# Patient Record
Sex: MALE
[De-identification: names, ages, dates, MRNs, and addresses within clinical notes are randomized per-mention and may not be internally consistent; named-entity substitution may affect disease eponyms.]

---

## 2020-05-14 ENCOUNTER — TRANSCRIPTION ENCOUNTER (OUTPATIENT)
Age: 60
End: 2020-05-14

## 2021-12-09 ENCOUNTER — APPOINTMENT (OUTPATIENT)
Dept: GASTROENTEROLOGY | Facility: CLINIC | Age: 61
End: 2021-12-09

## 2021-12-09 PROBLEM — Z00.00 ENCOUNTER FOR PREVENTIVE HEALTH EXAMINATION: Status: ACTIVE | Noted: 2021-12-09

## 2024-03-05 ENCOUNTER — APPOINTMENT (OUTPATIENT)
Dept: GASTROENTEROLOGY | Facility: CLINIC | Age: 64
End: 2024-03-05
Payer: COMMERCIAL

## 2024-03-05 VITALS
TEMPERATURE: 98.2 F | SYSTOLIC BLOOD PRESSURE: 155 MMHG | HEIGHT: 67 IN | RESPIRATION RATE: 16 BRPM | DIASTOLIC BLOOD PRESSURE: 80 MMHG | OXYGEN SATURATION: 98 % | BODY MASS INDEX: 24.17 KG/M2 | WEIGHT: 154 LBS | HEART RATE: 68 BPM

## 2024-03-05 DIAGNOSIS — R19.7 DIARRHEA, UNSPECIFIED: ICD-10-CM

## 2024-03-05 PROCEDURE — 99204 OFFICE O/P NEW MOD 45 MIN: CPT

## 2024-03-05 NOTE — PHYSICAL EXAM
[Normal Voice/Communication] : normal voice/communication [No Acute Distress] : no acute distress [Normal Appearance] : the appearance of the neck was normal [No Respiratory Distress] : no respiratory distress [No Acc Muscle Use] : no accessory muscle use [Abdomen Tenderness] : non-tender [Bowel Sounds] : normal bowel sounds [Abdomen Soft] : soft [No Masses] : no abdominal mass palpated [Normal Color / Pigmentation] : normal skin color and pigmentation [] : no rash [Normal] : oriented to person, place, and time

## 2024-03-19 NOTE — HISTORY OF PRESENT ILLNESS
[FreeTextEntry1] : 63 yo M referred today for concern for suspected chronic pancreatitis with elevated IGG4  Was at baseline state of health until about 2-3 year ago when he experienced acute abdominal pain which prompted f/u with Dr. Rendon.  Per outside records, seen in 2022 for epigastric pain and mild elevation in amlyase/AST.  CT A/P in Dec 2021 suggesting minimal peripanc infiltration in tail.  He recalls having an EGD that was normal. No report available.  Subsequently developed stools which were more oily in appearance. Seems to be exacerbated by certain foods.  Having 2x BMs daily.    Again underwent CT A/P in 2023 showing diffuse pancreatic parenchymal atrophy with associated mild diffuse PD dilatation. Few small filling defects in PD c/w calculi. Liver normal. biliary tree normal.   Started on creon by Dr. Rendon with improvement in stools.  Labs in 2024- SILVANO, ASMA, AMA, anti-LK microsomal Ab, unremarkable. Ceruloplasmin 15 Alk phos 78 AST 48 ALT 91 Bili 0.7 IgG4 521  Has lost 30 lbs over about 1 year.    Colonoscopy in [id number] was normal as far as he knows  PMhx:  -DM2 (last A1C >8) -HTN -HLD  Medications: -Tresiba daily -Novolog 12 units qAC -Zetia 10 mg daily -Crestor 40 mg daily -Amlodipine/Benazepril 10-40 mg daily -Dapagliflozin/metformin 5-1000 mg daily -Creon 2-3 capsules before meals2  Allergies: NKDA Surgical Hx: None Family: Father DM2, Sister CRC  at 69 Social Hx: No ETOH. Former smoker >20 ppy hx, quit

## 2024-03-19 NOTE — ASSESSMENT
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  Very likely pancreatic insufficiency and associated exocrine failure also contributing difficulty achieving glycemic control.  At this point, mgmt of underlying panc manifestations of suspected IGG4 related disease with steroid therapy of limited utility and would contribute to more harm related to steroid therapy (ie. hyperglycemia).  As such, primary goal should be controlling and optimizing sequelae of pancreatic insufficiency/chronic pancreatitis.  Recommendations: -Check fecal elastase and qualitative fecal fat; will need to stop creon for 72 hours before -Continued f/u with endo to optimize glycemic control -Continue creon -Rheumatology f/u to complete broad autoimmune eval  Seen and discussed with Dr. Richard Middleton DO GI Fellow [facility]

## 2024-03-25 ENCOUNTER — APPOINTMENT (OUTPATIENT)
Dept: RHEUMATOLOGY | Facility: CLINIC | Age: 64
End: 2024-03-25
Payer: COMMERCIAL

## 2024-03-25 VITALS
SYSTOLIC BLOOD PRESSURE: 156 MMHG | TEMPERATURE: 98.1 F | DIASTOLIC BLOOD PRESSURE: 84 MMHG | OXYGEN SATURATION: 98 % | HEIGHT: 67 IN | BODY MASS INDEX: 24.01 KG/M2 | HEART RATE: 64 BPM | WEIGHT: 153 LBS

## 2024-03-25 PROCEDURE — 99204 OFFICE O/P NEW MOD 45 MIN: CPT

## 2024-03-25 PROCEDURE — 36415 COLL VENOUS BLD VENIPUNCTURE: CPT

## 2024-03-25 NOTE — HISTORY OF PRESENT ILLNESS
[FreeTextEntry1] : 63 yo M w T2DM,  chronic pancreatitis,  and  elevated IGG4 Pt has longstanding h/o T2DM, now on insulin. Was his usual self up until  3 year ago when he started experiencing abdominal pain, was dx w pancreatitis in the setting of elevated amlyase and AST. CT A/P in Dec 2021 suggesting minimal peripancreatic infiltration in tail. [facility] GI note he had 'EGD that was normal'. Since that time he was experiencing  oily stools and weight loss. In the past 1-2 yrs lost 30 lb. T2DM was worsening A1C >9 and was placed on insulin. CT A/P in 12/29/2023 w diffuse pancreatic parenchymal atrophy with associated mild diffuse PD dilatation. Few small filling defects in PD c/w calculi. Liver normal. biliary tree normal and lower portion of the lungs normal.  Started on creon.  Today denies joint pain or swelling, no fevers, chills or night sweats. Has no LAD, orbital swelling, rashes, abdominal pain, morning stiffness, CP and no SOB. Morning stiffness few minutes, diffuse.  Labs in Jan 2024- SILVANO, ASMA, AMA, anti-LK microsomal Ab, unremarkable. Ceruloplasmin 15 Alk phos 78 AST 48 ALT 91 Bili 0.7 IgG4 521

## 2024-03-25 NOTE — PHYSICAL EXAM
[TextEntry] :   PHYSICAL EXAM: Vital signs reviewed, normal . Pain 5/10 GEN: AAOx3, NAD - well appearing EYES: PERRL, EOMI. EAR, NOSE AND THROAT:  Oropharynx pink w/o ulcers or exudates. Poor dentition. B/l enlarged submandibular glands, painless HEMATOLOGIC/LYMPHATIC: No LAD. PULMONARY: Good air movement, CTA, no wheezes or rales. CARDIO-VASCULAR: NS1S2, RRR, no mrg. GASTRO-INTESTINAL: Normoactive BS, soft NT/ND, no organomegaly. MUSCULOSKELETAL: DIPs- Heberden's no synovitis. PIPs- Margarito's, no synovitis. MCPs- Full ROM, no synovitis. WRISTS- Full ROM, no synovitis. ELBOWS- Full ROM, no synovitis. SHOULDERS- Full ROM b/l. No localized tenderness in subacromial bursa or biceps tendon. HIPS- Full ROM including internal and external rotation; No localized tenderness over greater trochanters. KNEES- No effusion, normal ROM, no local tenderness in the region of anserine bursa or joint line. ANKLES- Full ROM, no synovitis. FEET-No  MTP squeeze tenderness. Warm extremities, 2+ radial and pedal pulses, no peripheral edema. NEUROLOGIC: Fluent speech, normal gait. Muscle strength 5/5 in all extremities. SKIN: Clear, no rashes.

## 2024-03-25 NOTE — REVIEW OF SYSTEMS
Head,  normocephalic,  atraumatic,  Face,  Face within normal limits,  Ears,  External ears within normal limits,  Nose/Nasopharynx,  External nose  normal appearance,  nares patent,  no nasal discharge,  Mouth and Throat,  Oral cavity appearance normal,  Breath odor normal,  Lips,  Appearance normal [TextEntry] :  REVIEW OF THE SYSTEMS CONSTITUTIONAL: No fever, chills, night sweats. Has weight loss. SKIN: No rash, psoriatic plaques, photosensitivity, Raynaud's phenomenon, subcutaneous nodules, tophi, alopecia, skin thickening. EYES: No dry eyes, history of inflammatory eye disease. No blurry vision or diplopia. ENMT: No dry mouth, oral ulcers, sinus problems, epistaxis. No jaw or scalp tenderness. MUSCULOSKELETAL: No joint pain or swelling. NEUROLOGIC: No HA, paresthesia, generalized or focal weakness. CARDIO-VASCULAR: No CP, orthopnea, palpitations. PULMONARY: No SOB, cough, hemoptysis, wheezing. GASTROINTESTINAL: No recent/current diarrhea, nausea/vomiting, abdominal pain, bleed, dysphagia or GERD. GENITOURINARY: No hematuria or dysuria. HEMATOLOGIC/LYMPHATIC: No lymphadenopathy.

## 2024-03-25 NOTE — ASSESSMENT
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 Plan: - Assess basic ID w/up TB, HBV and HCV screens, IgG levels, CRP, Sed rate, Immunofixation, SPEP, IgE levels, C3, C4 and Ua UPCr. - CT neck - Will not rx steroids now given uncontrolled DM. Discussed Immunosuppression options for tx of IgG 4 Rd.  - Pain mng   - Follow up in 4w   Tana Valente MD Rheumatology Attending  #

## 2024-03-28 LAB
FAT STL QN: NORMAL
FAT STL QN: NORMAL
PANCREATIC ELASTASE, FECAL: <50 CD:794062645

## 2024-03-29 LAB
ALBUMIN MFR SERPL ELPH: 52.7 %
ALBUMIN SERPL ELPH-MCNC: 4.2 G/DL
ALBUMIN SERPL-MCNC: 3.9 G/DL
ALBUMIN/GLOB SERPL: 1.1 RATIO
ALBUPE: 9.7 %
ALP BLD-CCNC: 85 U/L
ALPHA1 GLOB MFR SERPL ELPH: 3 %
ALPHA1 GLOB SERPL ELPH-MCNC: 0.2 G/DL
ALPHA1UPE: 29.3 %
ALPHA2 GLOB MFR SERPL ELPH: 9.5 %
ALPHA2 GLOB SERPL ELPH-MCNC: 0.7 G/DL
ALPHA2UPE: 20.3 %
ALT SERPL-CCNC: 66 U/L
ANION GAP SERPL CALC-SCNC: 9 MMOL/L
APPEARANCE: CLEAR
AST SERPL-CCNC: 32 U/L
B-GLOBULIN MFR SERPL ELPH: 11.9 %
B-GLOBULIN SERPL ELPH-MCNC: 0.9 G/DL
BASOPHILS # BLD AUTO: 0.11 K/UL
BASOPHILS NFR BLD AUTO: 1.8 %
BETAUPE: 21.3 %
BILIRUB SERPL-MCNC: 0.5 MG/DL
BILIRUBIN URINE: NEGATIVE
BLOOD URINE: NEGATIVE
BUN SERPL-MCNC: 12 MG/DL
C3 SERPL-MCNC: 99 MG/DL
C4 SERPL-MCNC: 20 MG/DL
CALCIUM SERPL-MCNC: 8.7 MG/DL
CHLORIDE SERPL-SCNC: 105 MMOL/L
CO2 SERPL-SCNC: 25 MMOL/L
COLOR: YELLOW
CREAT SERPL-MCNC: 0.91 MG/DL
CREAT SPEC-SCNC: 142 MG/DL
CREAT/PROT UR: 0.1 RATIO
CRP SERPL-MCNC: <3 MG/L
EGFR: 94 ML/MIN/1.73M2
EOSINOPHIL # BLD AUTO: 0.42 K/UL
EOSINOPHIL NFR BLD AUTO: 6.9 %
ERYTHROCYTE [SEDIMENTATION RATE] IN BLOOD BY WESTERGREN METHOD: 18 MM/HR
GAMMA GLOB FLD ELPH-MCNC: 1.7 G/DL
GAMMA GLOB MFR SERPL ELPH: 22.9 %
GAMMAUPE: 19.4 %
GLUCOSE QUALITATIVE U: >=1000 MG/DL
GLUCOSE SERPL-MCNC: 126 MG/DL
HBV CORE IGG+IGM SER QL: NONREACTIVE
HBV SURFACE AB SER QL: NONREACTIVE
HBV SURFACE AG SER QL: NONREACTIVE
HCT VFR BLD CALC: 45 %
HCV AB SER QL: NONREACTIVE
HCV S/CO RATIO: 0.19 S/CO
HGB BLD-MCNC: 15.2 G/DL
IGA 24H UR QL IFE: NORMAL
IGG SER QL IEP: 1835 MG/DL
IGG1 SER-MCNC: 656 MG/DL
IGG2 SER-MCNC: 691 MG/DL
IGG3 SER-MCNC: 101.5 MG/DL
IGG4 SER-MCNC: 495.9 MG/DL
IMM GRANULOCYTES NFR BLD AUTO: 0.2 %
INTERPRETATION SERPL IEP-IMP: NORMAL
KAPPA LC 24H UR QL: NORMAL
KETONES URINE: NEGATIVE MG/DL
LEUKOCYTE ESTERASE URINE: NEGATIVE
LYMPHOCYTES # BLD AUTO: 2.08 K/UL
LYMPHOCYTES NFR BLD AUTO: 33.9 %
M PROTEIN SPEC IFE-MCNC: NORMAL
M TB IFN-G BLD-IMP: NEGATIVE
MAN DIFF?: NORMAL
MCHC RBC-ENTMCNC: 29.2 PG
MCHC RBC-ENTMCNC: 33.8 GM/DL
MCV RBC AUTO: 86.5 FL
MONOCYTES # BLD AUTO: 0.55 K/UL
MONOCYTES NFR BLD AUTO: 9 %
NEUTROPHILS # BLD AUTO: 2.96 K/UL
NEUTROPHILS NFR BLD AUTO: 48.2 %
NITRITE URINE: NEGATIVE
PH URINE: 5.5
PLATELET # BLD AUTO: 184 K/UL
POTASSIUM SERPL-SCNC: 3.8 MMOL/L
PROT PATTERN 24H UR ELPH-IMP: NORMAL
PROT SERPL-MCNC: 7.4 G/DL
PROT UR-MCNC: 20 MG/DL
PROTEIN URINE: NORMAL MG/DL
QUANTIFERON TB PLUS MITOGEN MINUS NIL: 6.72 IU/ML
QUANTIFERON TB PLUS NIL: 0.02 IU/ML
QUANTIFERON TB PLUS TB1 MINUS NIL: 0 IU/ML
QUANTIFERON TB PLUS TB2 MINUS NIL: 0 IU/ML
RBC # BLD: 5.2 M/UL
RBC # FLD: 13.6 %
SODIUM SERPL-SCNC: 139 MMOL/L
SPECIFIC GRAVITY URINE: 1.03
UROBILINOGEN URINE: 0.2 MG/DL
WBC # FLD AUTO: 6.13 K/UL

## 2024-04-08 LAB — IGE AB SERPL QL: 33 NG/ML

## 2024-04-29 ENCOUNTER — APPOINTMENT (OUTPATIENT)
Dept: RHEUMATOLOGY | Facility: CLINIC | Age: 64
End: 2024-04-29

## 2024-04-29 DIAGNOSIS — K86.89 OTHER SPECIFIED DISEASES OF PANCREAS: ICD-10-CM

## 2024-04-29 DIAGNOSIS — K86.1 OTHER CHRONIC PANCREATITIS: ICD-10-CM

## 2024-04-29 DIAGNOSIS — D89.84 IGG4-RELATED DISEASE: ICD-10-CM

## 2024-04-29 NOTE — ASSESSMENT
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 Plan: - Assess basic ID w/up TB, HBV and HCV screens, IgG levels, CRP, Sed rate, Immunofixation, SPEP, IgE levels, C3, C4 and Ua UPCr. - CT neck - Will not rx steroids now given uncontrolled DM. Discussed Immunosuppression options for tx of IgG 4 Rd. - Pain mng  - Follow up in 4w  Tana Valente MD Rheumatology Attending

## 2024-04-29 NOTE — REVIEW OF SYSTEMS
[TextEntry] : REVIEW OF THE SYSTEMS CONSTITUTIONAL: No fever, chills, night sweats. Has weight loss. SKIN: No rash, psoriatic plaques, photosensitivity, Raynaud's phenomenon, subcutaneous nodules, tophi, alopecia, skin thickening. EYES: No dry eyes, history of inflammatory eye disease. No blurry vision or diplopia. ENMT: No dry mouth, oral ulcers, sinus problems, epistaxis. No jaw or scalp tenderness. MUSCULOSKELETAL: No joint pain or swelling. NEUROLOGIC: No HA, paresthesia, generalized or focal weakness. CARDIO-VASCULAR: No CP, orthopnea, palpitations. PULMONARY: No SOB, cough, hemoptysis, wheezing. GASTROINTESTINAL: No recent/current diarrhea, nausea/vomiting, abdominal pain, bleed, dysphagia or GERD. GENITOURINARY: No hematuria or dysuria. HEMATOLOGIC/LYMPHATIC: No lymphadenopathy.